# Patient Record
Sex: FEMALE | Employment: FULL TIME | URBAN - METROPOLITAN AREA
[De-identification: names, ages, dates, MRNs, and addresses within clinical notes are randomized per-mention and may not be internally consistent; named-entity substitution may affect disease eponyms.]

---

## 2024-11-21 NOTE — PROGRESS NOTES
"Subjective     Patient ID: Tanja Garcia is a 19 y.o. female who presents for Establish Care (Spot on her back that looks concerning. She has also been experiencing fatigue and dizziness for a couple months along with back pain and pain in her left hip. Stomach issues with diarrhea and constipation and excessive burping. ).    HPI    Tanaj is here to establish care she has the following concerns    Back and hip pain: Middle back and left hip pain. She has had a few falls down the stairs and had noticed the pain. Her hip pain is in the left hip, it is deep and sharp, she is usually able to make it go away by doing yoga, it is not helping this time.     Acne: Beef tallow, colostrum  Skin: abnormal boy left side of back    Excessive burping: This happens through out the day, more on an empty stomach, if she does not burp she feels nauseated.     Diarrhea/Constipation:BM 4-5 x day, even when she has diarrhea she stills has a constipated feeling she has mucus with her diarrhea. States when she is constipated she has little rabbit pellets. Takes probiotics Align when she remembers, has tried fermented foods, kefir, she does eat greek yogurt daily.  She follows a grain free diet. Denies shania blood, or black tarry stools.     Dizziness- Sometimes first thing in the morning, she gets out of bed feels that she is going to fall over and gets dizzy also happens when she goes from sitting to standing.  Upon waking, she lays in bed for about  30 minutes to prevent this feeling this does not always help.  She denies chest pain or shortness of breath when this occurs.     ADHD: Was on adderall in the past no longer taking, feels its controlled    Depression: Was on Prozac in the past, did not like how she felt, she was a zombie on it. Sees counseling every other week. 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 States biologic mother could not " "either  H/o adhd and mood disturbance , cutting history, last cut 2 years ago  Used to be on trazodone, prozac and adderall for ADHD and mood disturbance . Off of all of these now  Very happy to be living w/friend and her family. Happy to have friend's mother as her legal guardian.  Biologic mother lives in [place], [place]. Texts mother for communication  Minimally texts biologic father  Mom kicked her out of the house  Grew up in various areas of [place], [place]  Lived with biologic mom until age 3yo, then Maternal 1/2 aunt from 4-17yo     Getting  in June- thinking about doing a train through [id number]   Works at [employer]    Review of Systems    Social   history:  Used to vape Nicotine quit over a year ago  She will have a glass of wine socially  She does not use drugs     Medical history:   Speech impediment  ADHD  Mood Disturbance  Sleep disturbance    SurgHx:   History reviewed. No pertinent surgical history.      FAMILY HISTORY:   Family History   Problem Relation Age of Onset    Leukemia Other    Lung cancer Other    Throat cancer Other    Drug abuse Mother    Seizures Father    Skin cancer Maternal Grandmother    Heart disease Maternal Grandfather      Medications:   Beef tallow   colostrum    Allergies   Allergen Reactions    Sulfa (Sulfonamide Antibiotics) Nausea/vomiting      Review of systems completed and unremarkable other than what is documented in HPI.    Objective   /81 (BP Location: Right arm)   Pulse 83   Ht 1.575 m (5' 2\")   Wt 82 kg (180 lb 12.8 oz)   BMI 33.07 kg/m²     Physical Exam    Gen: No acute distress, alert and oriented x3, pleasant   HEENT: moist mucous membranes, b/l external auditory canals are clear of debris, TMs within normal limits, no oropharyngeal lesions, eomi, perrla   Neck: thyroid within normal limits, no lymphadenopathy   CV: RRR, normal S1/S2, no murmur   Resp: Clear to auscultation bilaterally, no wheezes or rhonchi appreciated  Abd: Low abdominal " "discomfort when palpated, soft, nontender, non-distended, no guarding/rigidity, bowel sounds present  Extr: no edema, no calf tenderness  Derm: Red lesion noted left flank 1.5 \"x 0.5\", flat, irregular borders, smooth, does not flake Skin is warm and dry, no rashes appreciated  Psych: mood is good, affect is congruent, good hygiene, normal speech and eye contact  Neuro: cranial nerves grossly intact, normal gait    Assessment & Plan  Acute midline thoracic back pain  Pain of left hip  Continue Yoga  Suggested chiropractor    Irritable bowel syndrome with both constipation and diarrhea  Burping  Start fiber supplement- Metamucil suggested  Continue clean eating.   Increase fluids  CBC, CMP  Screening for thyroid disorder  TSH w/reflex to Free T4  Lipid screening  Lipid panel                      "

## 2024-11-22 ENCOUNTER — LAB (OUTPATIENT)
Dept: LAB | Facility: LAB | Age: 19
End: 2024-11-22
Payer: COMMERCIAL

## 2024-11-22 ENCOUNTER — OFFICE VISIT (OUTPATIENT)
Dept: PRIMARY CARE | Facility: CLINIC | Age: 19
End: 2024-11-22
Payer: COMMERCIAL

## 2024-11-22 VITALS
DIASTOLIC BLOOD PRESSURE: 81 MMHG | WEIGHT: 180.8 LBS | SYSTOLIC BLOOD PRESSURE: 126 MMHG | BODY MASS INDEX: 33.27 KG/M2 | HEART RATE: 83 BPM | HEIGHT: 62 IN

## 2024-11-22 DIAGNOSIS — K58.2 IRRITABLE BOWEL SYNDROME WITH BOTH CONSTIPATION AND DIARRHEA: ICD-10-CM

## 2024-11-22 DIAGNOSIS — R14.2 BURPING: ICD-10-CM

## 2024-11-22 DIAGNOSIS — M25.552 PAIN OF LEFT HIP: Primary | ICD-10-CM

## 2024-11-22 DIAGNOSIS — Z13.220 LIPID SCREENING: ICD-10-CM

## 2024-11-22 DIAGNOSIS — M54.6 ACUTE MIDLINE THORACIC BACK PAIN: ICD-10-CM

## 2024-11-22 DIAGNOSIS — Z13.29 SCREENING FOR THYROID DISORDER: ICD-10-CM

## 2024-11-22 DIAGNOSIS — M25.552 PAIN OF LEFT HIP: ICD-10-CM

## 2024-11-22 LAB
ALBUMIN SERPL BCP-MCNC: 4.9 G/DL (ref 3.4–5)
ALP SERPL-CCNC: 46 U/L (ref 33–110)
ALT SERPL W P-5'-P-CCNC: 10 U/L (ref 7–45)
ANION GAP SERPL CALC-SCNC: 11 MMOL/L (ref 10–20)
AST SERPL W P-5'-P-CCNC: 17 U/L (ref 9–39)
BASOPHILS # BLD AUTO: 0.01 X10*3/UL (ref 0–0.1)
BASOPHILS NFR BLD AUTO: 0.2 %
BILIRUB SERPL-MCNC: 1.1 MG/DL (ref 0–1.2)
BUN SERPL-MCNC: 19 MG/DL (ref 6–23)
CALCIUM SERPL-MCNC: 9.9 MG/DL (ref 8.6–10.3)
CHLORIDE SERPL-SCNC: 101 MMOL/L (ref 98–107)
CHOLEST SERPL-MCNC: 161 MG/DL (ref 0–199)
CHOLESTEROL/HDL RATIO: 2.8
CO2 SERPL-SCNC: 28 MMOL/L (ref 21–32)
CREAT SERPL-MCNC: 0.8 MG/DL (ref 0.5–1.05)
EGFRCR SERPLBLD CKD-EPI 2021: >90 ML/MIN/1.73M*2
EOSINOPHIL # BLD AUTO: 0.1 X10*3/UL (ref 0–0.7)
EOSINOPHIL NFR BLD AUTO: 1.5 %
ERYTHROCYTE [DISTWIDTH] IN BLOOD BY AUTOMATED COUNT: 12.7 % (ref 11.5–14.5)
GLUCOSE SERPL-MCNC: 91 MG/DL (ref 74–99)
HCT VFR BLD AUTO: 43.9 % (ref 36–46)
HDLC SERPL-MCNC: 58.2 MG/DL
HGB BLD-MCNC: 15 G/DL (ref 12–16)
IMM GRANULOCYTES # BLD AUTO: 0.02 X10*3/UL (ref 0–0.7)
IMM GRANULOCYTES NFR BLD AUTO: 0.3 % (ref 0–0.9)
LDLC SERPL CALC-MCNC: 89 MG/DL
LYMPHOCYTES # BLD AUTO: 1.72 X10*3/UL (ref 1.2–4.8)
LYMPHOCYTES NFR BLD AUTO: 26.3 %
MCH RBC QN AUTO: 29.7 PG (ref 26–34)
MCHC RBC AUTO-ENTMCNC: 34.2 G/DL (ref 32–36)
MCV RBC AUTO: 87 FL (ref 80–100)
MONOCYTES # BLD AUTO: 0.46 X10*3/UL (ref 0.1–1)
MONOCYTES NFR BLD AUTO: 7 %
NEUTROPHILS # BLD AUTO: 4.23 X10*3/UL (ref 1.2–7.7)
NEUTROPHILS NFR BLD AUTO: 64.7 %
NON HDL CHOLESTEROL: 103 MG/DL (ref 0–149)
NRBC BLD-RTO: NORMAL /100{WBCS}
PLATELET # BLD AUTO: 270 X10*3/UL (ref 150–450)
POTASSIUM SERPL-SCNC: 4.2 MMOL/L (ref 3.5–5.3)
PROT SERPL-MCNC: 7.5 G/DL (ref 6.4–8.2)
RBC # BLD AUTO: 5.05 X10*6/UL (ref 4–5.2)
SODIUM SERPL-SCNC: 136 MMOL/L (ref 136–145)
TRIGL SERPL-MCNC: 69 MG/DL (ref 0–114)
TSH SERPL-ACNC: 1.63 MIU/L (ref 0.44–3.98)
VLDL: 14 MG/DL (ref 0–40)
WBC # BLD AUTO: 6.5 X10*3/UL (ref 4.4–11.3)

## 2024-11-22 PROCEDURE — 85025 COMPLETE CBC W/AUTO DIFF WBC: CPT

## 2024-11-22 PROCEDURE — 80061 LIPID PANEL: CPT

## 2024-11-22 PROCEDURE — 36415 COLL VENOUS BLD VENIPUNCTURE: CPT

## 2024-11-22 PROCEDURE — 3008F BODY MASS INDEX DOCD: CPT

## 2024-11-22 PROCEDURE — 84443 ASSAY THYROID STIM HORMONE: CPT

## 2024-11-22 PROCEDURE — 1036F TOBACCO NON-USER: CPT

## 2024-11-22 PROCEDURE — 99204 OFFICE O/P NEW MOD 45 MIN: CPT

## 2024-11-22 PROCEDURE — 80053 COMPREHEN METABOLIC PANEL: CPT

## 2024-11-22 NOTE — PATIENT INSTRUCTIONS
Chiropractors:    Dr. Garcia  [address], [place], [place]  [phone]  Takes all insurances    Dr. Sharma  [address], [place], [place]  [phone]    [facility]  [address], [place], [place], [address]  [phone]

## 2024-11-22 NOTE — ASSESSMENT & PLAN NOTE
Burping  Start fiber supplement- Metamucil suggested  Continue clean eating.   Increase fluids  CBC, CMP

## 2025-01-03 ENCOUNTER — APPOINTMENT (OUTPATIENT)
Dept: PRIMARY CARE | Facility: CLINIC | Age: 20
End: 2025-01-03
Payer: COMMERCIAL

## 2025-01-15 NOTE — PROGRESS NOTES
Subjective   Patient ID: Tanja Garcia is a 19 y.o. female who presents for Follow-up (No new concerns at this time; ).    HPI     Tanja is here today for a 6 week follow up. States she has had vomiting x3 since her last visit.     Vomiting: all on separate occasions.   -First occurrence she was at work ate something, got really flushed and was vomiting.   -Second occurrence she was in the back of the car getting pizza, with her friends and her fiance, she became ill and started to vomit.   -Third time she had ate a breadstick with marinara, the bottom of her tongue started to tingle and she started to vomit.   In the past she had taken Betapepcin to help with nausea and burping. She has changed her diet and eating more fermented foods, yogurt, and taking pre biotics. She has cut back her caffeine to one serving a day and is working out, she is following with a . Denies fever, fatigue, bloody or coffee ground colored emesis.     Constipation: Has improved but she is still having  feelings of fullness, bloating, and discomfort, she has increased her fiber and water intake.     Excessive burping: This happens through out the day has improved and not feeling as nauseated anymore.     Back and hip pain: Better, going to the gym and doing yoga.   Middle back and left hip pain due to falls in the past.     Acne: Beef tallow, colostrum  Skin: abnormal boy left side of back     Dizziness- Improved, she is taking her time with positional changes.   This was occurring  first thing in the morning, when getting out of bed, or from sitting to standing was feeling like she was going to fall over.  Upon waking, she lays in bed for about  30 minutes to prevent this feeling this does not always help.  She denies chest pain or shortness of breath when this occurs.      ADHD: Still  no medications and doing well. Eating healthy and exercising. Was on adderall in the past.     Depression: Still doing well with out  "medications, was on Prozac in the past, felt like a zombie so she stopped taking it. Sees counseling every other week.      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 States biologic mother could not either  H/o adhd and mood disturbance , cutting history, last cut 2 years ago  Used to be on trazodone, prozac and adderall for ADHD and mood disturbance . Off of all of these now  Very happy to be living w/friend and her family. Happy to have friend's mother as her legal guardian.  Biologic mother lives in [place], [place]. 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     Objective   /77 (BP Location: Right arm, Patient Position: Sitting, BP Cuff Size: Adult)   Pulse 73   Wt 80.3 kg (177 lb)   BMI 32.37 kg/m²     Physical Exam    Gen: No acute distress, alert and oriented x3, pleasant   HEENT: moist mucous membranes, b/l external auditory canals are clear of debris, TMs within normal limits, no oropharyngeal lesions, eomi, perrla   Neck: thyroid within normal limits, no lymphadenopathy   CV: RRR, normal S1/S2, no murmur   Resp: Clear to auscultation bilaterally, no wheezes or rhonchi appreciated  Abd: epigastric discomfort when palpated, soft, nontender, non-distended, no guarding/rigidity, bowel sounds present  Extr: no edema, no calf tenderness  Derm: Red lesion noted left flank 1.5 \"x 0.5\", flat, irregular borders, smooth, does not flake Skin is warm and dry, no rashes appreciated  Psych: mood is good, affect is congruent, good hygiene, normal speech and eye contact  Neuro: cranial nerves grossly intact, normal gait    Assessment/Plan "       #Acute midline thoracic back pain  #Pain of left hip  Resolved, continue to work on core exercising  Continue Yoga  Suggested chiropractor     #IBS mixed constipation/diarrhea  #Burping  #Vomiting  ABD XR  H.Pylori breath test  Continue fiber supplement, probiotics, betapepcin, and healthy diet.  Increase fluids  - If issues persist or H.Pylori + will refer to GI.

## 2025-01-17 ENCOUNTER — APPOINTMENT (OUTPATIENT)
Dept: PRIMARY CARE | Facility: CLINIC | Age: 20
End: 2025-01-17
Payer: COMMERCIAL

## 2025-01-17 ENCOUNTER — HOSPITAL ENCOUNTER (OUTPATIENT)
Dept: RADIOLOGY | Facility: HOSPITAL | Age: 20
Discharge: HOME | End: 2025-01-17
Payer: COMMERCIAL

## 2025-01-17 ENCOUNTER — LAB (OUTPATIENT)
Dept: LAB | Facility: LAB | Age: 20
End: 2025-01-17
Payer: COMMERCIAL

## 2025-01-17 VITALS
SYSTOLIC BLOOD PRESSURE: 130 MMHG | WEIGHT: 177 LBS | BODY MASS INDEX: 32.37 KG/M2 | DIASTOLIC BLOOD PRESSURE: 77 MMHG | HEART RATE: 73 BPM

## 2025-01-17 DIAGNOSIS — R14.2 BURPING: ICD-10-CM

## 2025-01-17 DIAGNOSIS — K59.00 CONSTIPATION, UNSPECIFIED CONSTIPATION TYPE: ICD-10-CM

## 2025-01-17 DIAGNOSIS — R14.2 BURPING: Primary | ICD-10-CM

## 2025-01-17 DIAGNOSIS — R11.10 VOMITING, UNSPECIFIED VOMITING TYPE, UNSPECIFIED WHETHER NAUSEA PRESENT: ICD-10-CM

## 2025-01-17 PROCEDURE — 1036F TOBACCO NON-USER: CPT

## 2025-01-17 PROCEDURE — 83013 H PYLORI (C-13) BREATH: CPT

## 2025-01-17 PROCEDURE — 74018 RADEX ABDOMEN 1 VIEW: CPT

## 2025-01-17 PROCEDURE — 99214 OFFICE O/P EST MOD 30 MIN: CPT

## 2025-01-18 LAB — UREA BREATH TEST QL: NEGATIVE
